# Patient Record
Sex: MALE | Race: WHITE | Employment: FULL TIME | URBAN - NONMETROPOLITAN AREA
[De-identification: names, ages, dates, MRNs, and addresses within clinical notes are randomized per-mention and may not be internally consistent; named-entity substitution may affect disease eponyms.]

---

## 2024-04-07 ENCOUNTER — APPOINTMENT (OUTPATIENT)
Dept: CT IMAGING | Age: 49
End: 2024-04-07

## 2024-04-07 ENCOUNTER — HOSPITAL ENCOUNTER (EMERGENCY)
Age: 49
Discharge: HOME | End: 2024-04-07
Attending: EMERGENCY MEDICINE

## 2024-04-07 ENCOUNTER — APPOINTMENT (OUTPATIENT)
Dept: GENERAL RADIOLOGY | Age: 49
End: 2024-04-07

## 2024-04-07 VITALS
HEART RATE: 77 BPM | TEMPERATURE: 98.8 F | SYSTOLIC BLOOD PRESSURE: 115 MMHG | WEIGHT: 220 LBS | OXYGEN SATURATION: 96 % | BODY MASS INDEX: 31.5 KG/M2 | DIASTOLIC BLOOD PRESSURE: 80 MMHG | RESPIRATION RATE: 19 BRPM | HEIGHT: 70 IN

## 2024-04-07 DIAGNOSIS — J10.1 INFLUENZA B: Primary | ICD-10-CM

## 2024-04-07 DIAGNOSIS — J18.9 FOCAL PNEUMONIA: ICD-10-CM

## 2024-04-07 LAB
ANION GAP SERPL CALC-SCNC: 13 MEQ/L (ref 8–16)
BASOPHILS ABSOLUTE: 0 THOU/MM3 (ref 0–0.1)
BASOPHILS NFR BLD AUTO: 0.4 %
BUN SERPL-MCNC: 13 MG/DL (ref 7–22)
CALCIUM SERPL-MCNC: 8.8 MG/DL (ref 8.5–10.5)
CHLORIDE SERPL-SCNC: 102 MEQ/L (ref 98–111)
CO2 SERPL-SCNC: 26 MEQ/L (ref 23–33)
CREAT SERPL-MCNC: 0.8 MG/DL (ref 0.4–1.2)
D DIMER PPP IA.FEU-MCNC: 1236 NG/ML FEU (ref 0–500)
DEPRECATED RDW RBC AUTO: 41 FL (ref 35–45)
EOSINOPHIL NFR BLD AUTO: 1.2 %
EOSINOPHILS ABSOLUTE: 0.1 THOU/MM3 (ref 0–0.4)
ERYTHROCYTE [DISTWIDTH] IN BLOOD BY AUTOMATED COUNT: 12.6 % (ref 11.5–14.5)
FLUAV RNA RESP QL NAA+PROBE: NOT DETECTED
FLUBV RNA RESP QL NAA+PROBE: DETECTED
GFR SERPL CREATININE-BSD FRML MDRD: > 90 ML/MIN/1.73M2
GLUCOSE SERPL-MCNC: 97 MG/DL (ref 70–108)
HCT VFR BLD AUTO: 39.9 % (ref 42–52)
HGB BLD-MCNC: 14.1 GM/DL (ref 14–18)
IMM GRANULOCYTES # BLD AUTO: 0.19 THOU/MM3 (ref 0–0.07)
IMM GRANULOCYTES NFR BLD AUTO: 2.1 %
LYMPHOCYTES ABSOLUTE: 2 THOU/MM3 (ref 1–4.8)
LYMPHOCYTES NFR BLD AUTO: 22.3 %
MCH RBC QN AUTO: 31.5 PG (ref 26–33)
MCHC RBC AUTO-ENTMCNC: 35.3 GM/DL (ref 32.2–35.5)
MCV RBC AUTO: 89.1 FL (ref 80–94)
MONOCYTES ABSOLUTE: 0.6 THOU/MM3 (ref 0.4–1.3)
MONOCYTES NFR BLD AUTO: 6.6 %
NEUTROPHILS NFR BLD AUTO: 67.4 %
NRBC BLD AUTO-RTO: 0 /100 WBC
NT-PROBNP SERPL IA-MCNC: 55.9 PG/ML (ref 0–124)
OSMOLALITY SERPL CALC.SUM OF ELEC: 281.3 MOSMOL/KG (ref 275–300)
PLATELET # BLD AUTO: 297 THOU/MM3 (ref 130–400)
PMV BLD AUTO: 9.4 FL (ref 9.4–12.4)
POTASSIUM SERPL-SCNC: 3.3 MEQ/L (ref 3.5–5.2)
RBC # BLD AUTO: 4.48 MILL/MM3 (ref 4.7–6.1)
SARS-COV-2 RNA RESP QL NAA+PROBE: NOT DETECTED
SEGMENTED NEUTROPHILS ABSOLUTE COUNT: 6 THOU/MM3 (ref 1.8–7.7)
SODIUM SERPL-SCNC: 141 MEQ/L (ref 135–145)
TROPONIN, HIGH SENSITIVITY: 11 NG/L (ref 0–12)
WBC # BLD AUTO: 8.9 THOU/MM3 (ref 4.8–10.8)

## 2024-04-07 PROCEDURE — 99285 EMERGENCY DEPT VISIT HI MDM: CPT

## 2024-04-07 PROCEDURE — 93005 ELECTROCARDIOGRAM TRACING: CPT | Performed by: STUDENT IN AN ORGANIZED HEALTH CARE EDUCATION/TRAINING PROGRAM

## 2024-04-07 PROCEDURE — 85025 COMPLETE CBC W/AUTO DIFF WBC: CPT

## 2024-04-07 PROCEDURE — 80048 BASIC METABOLIC PNL TOTAL CA: CPT

## 2024-04-07 PROCEDURE — 83880 ASSAY OF NATRIURETIC PEPTIDE: CPT

## 2024-04-07 PROCEDURE — 85379 FIBRIN DEGRADATION QUANT: CPT

## 2024-04-07 PROCEDURE — 71046 X-RAY EXAM CHEST 2 VIEWS: CPT

## 2024-04-07 PROCEDURE — 71275 CT ANGIOGRAPHY CHEST: CPT

## 2024-04-07 PROCEDURE — 93010 ELECTROCARDIOGRAM REPORT: CPT | Performed by: INTERNAL MEDICINE

## 2024-04-07 PROCEDURE — 36415 COLL VENOUS BLD VENIPUNCTURE: CPT

## 2024-04-07 PROCEDURE — 84484 ASSAY OF TROPONIN QUANT: CPT

## 2024-04-07 PROCEDURE — 87636 SARSCOV2 & INF A&B AMP PRB: CPT

## 2024-04-07 PROCEDURE — 6360000004 HC RX CONTRAST MEDICATION: Performed by: EMERGENCY MEDICINE

## 2024-04-07 RX ORDER — AZITHROMYCIN 250 MG/1
TABLET, FILM COATED ORAL
Qty: 1 PACKET | Refills: 0 | Status: SHIPPED | OUTPATIENT
Start: 2024-04-07 | End: 2024-04-11 | Stop reason: ALTCHOICE

## 2024-04-07 RX ADMIN — IOPAMIDOL 80 ML: 755 INJECTION, SOLUTION INTRAVENOUS at 11:37

## 2024-04-07 ASSESSMENT — PAIN - FUNCTIONAL ASSESSMENT: PAIN_FUNCTIONAL_ASSESSMENT: NONE - DENIES PAIN

## 2024-04-07 NOTE — ED PROVIDER NOTES
[facility] EMERGENCY DEPT    Pt Name: Chris Gates  MRN: [id number]  Birthdate 1975  Date of evaluation: 4/7/2024  Resident Physician: Jesica Glasgow MD  Supervising Physician: Dr. Peter Sue DO    CHIEF COMPLAINT       Chief Complaint   Patient presents with    Cough    Shortness of Breath    Hemoptysis       HISTORY OF PRESENT ILLNESS   Chris Gates is a 48 y.o. male  who presents to the emergency department for evaluation of cough, shortness of breath, hemoptysis.    Patient states that he has been coughing approximately 2 weeks now.  States that his daughter was diagnosed with influenza B so he assumed that he had this as well and did not think much of it.  States that this morning, he was in the bathroom when he coughed up a large amount of bright red blood.  States that this was approximately 2 for stools of blood.  At that time, he was also short of breath.  Denies any chest pain.  Came to ED for further evaluation.  This is never happened to him before.    The patient has no other acute complaints at this time.    Review of Systems    Negative Except as Documented Above.    PASTMEDICAL HISTORY   History reviewed. No pertinent past medical history.    There is no problem list on file for this patient.    SURGICAL HISTORY     History reviewed. No pertinent surgical history.    CURRENT MEDICATIONS       Discharge Medication List as of 4/7/2024 12:41 PM          ALLERGIES     has No Known Allergies.    FAMILY HISTORY     has no family status information on file.        SOCIAL HISTORY       Social History     Tobacco Use    Smoking status: Former     Types: Cigarettes       PHYSICAL EXAM       ED Triage Vitals   BP Temp Temp Source Pulse Respirations SpO2 Height Weight - Scale   04/07/24 1037 04/07/24 1037 04/07/24 1037 04/07/24 1037 04/07/24 1037 04/07/24 1037 04/07/24 1038 04/07/24 1038   (!) 140/81 98.8 °F (37.1 °C) Oral 83 18 94 % 1.778 m (5' 10\") 99.8 kg (220 lb)       Physical 
today.      CTA CHEST W WO CONTRAST   Final Result      1. No evidence of pulmonary embolism.   2. Old granulomatous disease in the left upper lobe, preaortic space and left hilum.   3. Left lower lobe infiltrate.   4. 6.1 mm noncalcified nodule in the right middle lobe..               **This report has been created using voice recognition software. It may contain minor errors which are inherent in voice recognition technology.**      Final report electronically signed by DR MARYANN DOMINGUEZ on 4/7/2024 11:51 AM      XR CHEST (2 VW)   Final Result   1. Normal heart size. No effusion.   2. Mild left basilar pneumonia.            **This report has been created using voice recognition software.  It may contain minor errors which are inherent in voice recognition technology.**      Final report electronically signed by Dr. Jigar Au on 4/7/2024 11:11 AM        Labs Reviewed   COVID-19 & INFLUENZA COMBO - Abnormal; Notable for the following components:       Result Value    INFLUENZA B DETECTED (*)     All other components within normal limits   BASIC METABOLIC PANEL - Abnormal; Notable for the following components:    Potassium 3.3 (*)     All other components within normal limits   CBC WITH AUTO DIFFERENTIAL - Abnormal; Notable for the following components:    RBC 4.48 (*)     Hematocrit 39.9 (*)     Immature Grans (Abs) 0.19 (*)     All other components within normal limits   D-DIMER, QUANTITATIVE - Abnormal; Notable for the following components:    D-Dimer, Quant 1236.00 (*)     All other components within normal limits   TROPONIN   BRAIN NATRIURETIC PEPTIDE   ANION GAP   OSMOLALITY   GLOMERULAR FILTRATION RATE, ESTIMATED         Final diagnoses:   Influenza B   Focal pneumonia   .  I have seen this patient with the resident Dr. Gamboa and agree with her assessment and plan.     Peter Sue, DO  04/07/24 1952

## 2024-04-07 NOTE — DISCHARGE INSTRUCTIONS
You are seen today in the emergency department for coughing up blood earlier today.  You tested positive for influenza B.  Your chest x-ray shows that you have a pneumonia.  You do not have any blood clots.    We are sending home with antibiotics to treat your pneumonia.  Please take this as prescribed.    Follow-up with your family doctor regarding today's visit.  As you do not have 1 on file, I am referring you to the family medicine clinic    For your CT findings:   IMPRESSION:     1. No evidence of pulmonary embolism.  2. Old granulomatous disease in the left upper lobe, preaortic space and left hilum.  3. Left lower lobe infiltrate.  4. 6.1 mm noncalcified nodule in the right middle lobe..    I am referring you to the pulmonologist.

## 2024-04-07 NOTE — ED TRIAGE NOTES
Pt to ER due to cough, shortness of breath, and hemoptysis. He states he has had the cough for around two weeks but this morning for the first time he coughed up bright red blood and felt short of breath. EKG and labs completed upon arrival.

## 2024-04-10 SDOH — HEALTH STABILITY: PHYSICAL HEALTH: ON AVERAGE, HOW MANY DAYS PER WEEK DO YOU ENGAGE IN MODERATE TO STRENUOUS EXERCISE (LIKE A BRISK WALK)?: 5 DAYS

## 2024-04-10 SDOH — HEALTH STABILITY: PHYSICAL HEALTH: ON AVERAGE, HOW MANY MINUTES DO YOU ENGAGE IN EXERCISE AT THIS LEVEL?: 60 MIN

## 2024-04-11 ENCOUNTER — OFFICE VISIT (OUTPATIENT)
Dept: FAMILY MEDICINE CLINIC | Age: 49
End: 2024-04-11

## 2024-04-11 VITALS
HEART RATE: 63 BPM | RESPIRATION RATE: 16 BRPM | HEIGHT: 70 IN | SYSTOLIC BLOOD PRESSURE: 112 MMHG | WEIGHT: 205.6 LBS | BODY MASS INDEX: 29.43 KG/M2 | OXYGEN SATURATION: 96 % | DIASTOLIC BLOOD PRESSURE: 60 MMHG | TEMPERATURE: 97.7 F

## 2024-04-11 DIAGNOSIS — Z87.891 FORMER SMOKER: ICD-10-CM

## 2024-04-11 DIAGNOSIS — F17.220 NICOTINE DEPENDENCE, CHEWING TOBACCO, UNCOMPLICATED: ICD-10-CM

## 2024-04-11 DIAGNOSIS — J18.9 PNEUMONIA DUE TO INFECTIOUS ORGANISM, UNSPECIFIED LATERALITY, UNSPECIFIED PART OF LUNG: ICD-10-CM

## 2024-04-11 DIAGNOSIS — R91.8 ABNORMAL CT SCAN OF LUNG: Primary | ICD-10-CM

## 2024-04-11 PROBLEM — R76.8 HEPATITIS C ANTIBODY TEST POSITIVE: Status: RESOLVED | Noted: 2024-04-11 | Resolved: 2024-04-11

## 2024-04-11 PROBLEM — R76.8 HEPATITIS C ANTIBODY TEST POSITIVE: Status: ACTIVE | Noted: 2024-04-11

## 2024-04-11 LAB
EKG ATRIAL RATE: 86 BPM
EKG P AXIS: 71 DEGREES
EKG P-R INTERVAL: 144 MS
EKG Q-T INTERVAL: 378 MS
EKG QRS DURATION: 86 MS
EKG QTC CALCULATION (BAZETT): 452 MS
EKG R AXIS: 54 DEGREES
EKG T AXIS: 50 DEGREES
EKG VENTRICULAR RATE: 86 BPM

## 2024-04-11 PROCEDURE — 99203 OFFICE O/P NEW LOW 30 MIN: CPT | Performed by: NURSE PRACTITIONER

## 2024-04-11 RX ORDER — IBUPROFEN 600 MG/1
600 TABLET ORAL EVERY 8 HOURS PRN
COMMUNITY
Start: 2013-10-11 | End: 2024-04-11

## 2024-04-11 SDOH — ECONOMIC STABILITY: HOUSING INSECURITY
IN THE LAST 12 MONTHS, WAS THERE A TIME WHEN YOU DID NOT HAVE A STEADY PLACE TO SLEEP OR SLEPT IN A SHELTER (INCLUDING NOW)?: NO

## 2024-04-11 SDOH — ECONOMIC STABILITY: FOOD INSECURITY: WITHIN THE PAST 12 MONTHS, THE FOOD YOU BOUGHT JUST DIDN'T LAST AND YOU DIDN'T HAVE MONEY TO GET MORE.: NEVER TRUE

## 2024-04-11 SDOH — ECONOMIC STABILITY: FOOD INSECURITY: WITHIN THE PAST 12 MONTHS, YOU WORRIED THAT YOUR FOOD WOULD RUN OUT BEFORE YOU GOT MONEY TO BUY MORE.: NEVER TRUE

## 2024-04-11 SDOH — ECONOMIC STABILITY: INCOME INSECURITY: HOW HARD IS IT FOR YOU TO PAY FOR THE VERY BASICS LIKE FOOD, HOUSING, MEDICAL CARE, AND HEATING?: NOT HARD AT ALL

## 2024-04-11 ASSESSMENT — PATIENT HEALTH QUESTIONNAIRE - PHQ9
SUM OF ALL RESPONSES TO PHQ QUESTIONS 1-9: 0
2. FEELING DOWN, DEPRESSED OR HOPELESS: NOT AT ALL
SUM OF ALL RESPONSES TO PHQ9 QUESTIONS 1 & 2: 0
1. LITTLE INTEREST OR PLEASURE IN DOING THINGS: NOT AT ALL
SUM OF ALL RESPONSES TO PHQ QUESTIONS 1-9: 0

## 2024-04-11 ASSESSMENT — ENCOUNTER SYMPTOMS
ABDOMINAL PAIN: 0
EYE PAIN: 0
CHEST TIGHTNESS: 1
NAUSEA: 0
SORE THROAT: 0
SHORTNESS OF BREATH: 0
VOMITING: 0
COUGH: 1

## 2024-04-11 NOTE — PROGRESS NOTES
[facility] PROFESSIONAL [facility] MEDICINE  [address]  [facility] [address]  Dept: [phone]  Dept Fax: [phone]  Loc: [phone]     Visit Date:  4/11/2024    Patient:  Chris Gates  YOB: 1975  Age: 48 y.o.  Gender: male  BMI: Body mass index is 29.5 kg/m².    Chris Gates, New patient, is being seen today for   Chief Complaint   Patient presents with    New Patient    lung issues     Needing referral to lung specialist.  Had flu B last week and was coughing up blood. Pt stated they found something on his lungs on a CT scan.    .     Assessment/Plan      1. Abnormal CT scan of lung  -     [facility], [place]  2. Pneumonia due to infectious organism, unspecified laterality, unspecified part of lung  3. Nicotine dependence, chewing tobacco, uncomplicated    Was referred to pulmonology by ER but requires PCP referral. Referral placed.  Will see back in about 4 weeks for full physical and labs.    Return in about 4 weeks (around 5/9/2024) for Physical.    HPI:     Patient presents today for a new patient appointment. Former patient of none. Health maintenance reviewed. Never had colonoscopy. Medical history significant for Hep C - now immune, former heroin user. Current specialists include none. Current concerns include abnormal lung CT. Still has some cough from pneumonia and feels like he still needs to cough stuff up. Still has fatigue. Has not have fever or chills for about 1 week. Went to ER because he was coughing up blood on Sunday morning. CT in ER was abnormal and referral to pulmonology outpatient was recommended but pulmonology told him he will need a referral from his PCP.          Medications  No current outpatient medications on file.    The patient has No Known Allergies.    Past Medical History  Chris  has a past medical history of Hepatitis C antibody positive in blood and Hepatitis

## 2024-04-25 ENCOUNTER — OFFICE VISIT (OUTPATIENT)
Dept: PULMONOLOGY | Age: 49
End: 2024-04-25
Payer: COMMERCIAL

## 2024-04-25 VITALS
SYSTOLIC BLOOD PRESSURE: 116 MMHG | HEIGHT: 70 IN | OXYGEN SATURATION: 95 % | DIASTOLIC BLOOD PRESSURE: 84 MMHG | WEIGHT: 203.6 LBS | BODY MASS INDEX: 29.15 KG/M2 | HEART RATE: 66 BPM | TEMPERATURE: 98 F

## 2024-04-25 DIAGNOSIS — Z87.891 PERSONAL HISTORY OF SMOKING: ICD-10-CM

## 2024-04-25 DIAGNOSIS — Z87.01 HISTORY OF INFLUENZA PNEUMONIA: ICD-10-CM

## 2024-04-25 DIAGNOSIS — R04.2 HEMOPTYSIS: Primary | ICD-10-CM

## 2024-04-25 DIAGNOSIS — F12.90 MARIJUANA SMOKER: ICD-10-CM

## 2024-04-25 DIAGNOSIS — R91.1 LUNG NODULE: ICD-10-CM

## 2024-04-25 PROCEDURE — 99204 OFFICE O/P NEW MOD 45 MIN: CPT | Performed by: INTERNAL MEDICINE

## 2024-04-25 PROCEDURE — 99406 BEHAV CHNG SMOKING 3-10 MIN: CPT | Performed by: INTERNAL MEDICINE

## 2024-04-25 NOTE — PROGRESS NOTES
nodule  CT CHEST WO CONTRAST        History of testing positive for flu B at the emergency room 4/7/2024 however the appearance of the CT scan of the chest revealing a lobar infiltrate suggest a bacterial process.    6 mm right middle lobe lung nodule in a high risk patient    Hemoptysis due to acute lower respiratory tract infection currently resolved    31-pack-year history of smoking and strong family history of lung cancer.     Plan     Orders Placed This Encounter   Procedures    CT CHEST WO CONTRAST     Standing Status:   Future     Standing Expiration Date:   4/25/2025     Order Specific Question:   Reason for exam:     Answer:   Lung Nodules    Full PFT Study With Bronchodilator     If an ABG is needed along with this PFT procedure, please place the appropriate lab order     Standing Status:   Future     Standing Expiration Date:   4/25/2025      Chris has been strongly advised not to smoke any substances into his lungs, consider switching to edible cannabis.  Will bring Chris back with pulmonary function test and a follow-up CT scan of the chest to be done 6 weeks after initial CT.  Chris is to notify of my office if he resumes coughing up blood or his condition changes     Patient was counseled on tobacco cessation.  Based upon patient's motivation to change his behavior, the following plan was agreed upon: not ready to quit cannabis at this time He was provided with a list of local tobacco cessation resources. Provider spent 5 minutes counseling patient.     **This report has been created using voice recognition software. It may contain minor errors which are inherent in voice recognition technology.**

## 2024-07-25 ENCOUNTER — HOSPITAL ENCOUNTER (EMERGENCY)
Age: 49
Discharge: HOME | End: 2024-07-26
Attending: EMERGENCY MEDICINE

## 2024-07-25 ENCOUNTER — APPOINTMENT (OUTPATIENT)
Dept: GENERAL RADIOLOGY | Age: 49
End: 2024-07-25

## 2024-07-25 VITALS
BODY MASS INDEX: 32.21 KG/M2 | DIASTOLIC BLOOD PRESSURE: 62 MMHG | SYSTOLIC BLOOD PRESSURE: 108 MMHG | OXYGEN SATURATION: 95 % | RESPIRATION RATE: 14 BRPM | TEMPERATURE: 97.5 F | HEIGHT: 70 IN | WEIGHT: 225 LBS | HEART RATE: 58 BPM

## 2024-07-25 DIAGNOSIS — T40.604A OPIATE OVERDOSE, UNDETERMINED INTENT, INITIAL ENCOUNTER (HCC): Primary | ICD-10-CM

## 2024-07-25 LAB
ALBUMIN SERPL BCG-MCNC: 4.6 G/DL (ref 3.5–5.1)
ALP SERPL-CCNC: 88 U/L (ref 38–126)
ALT SERPL W/O P-5'-P-CCNC: 24 U/L (ref 11–66)
ANION GAP SERPL CALC-SCNC: 17 MEQ/L (ref 8–16)
AST SERPL-CCNC: 27 U/L (ref 5–40)
BASOPHILS ABSOLUTE: 0 THOU/MM3 (ref 0–0.1)
BASOPHILS NFR BLD AUTO: 0.3 %
BILIRUB CONJ SERPL-MCNC: 0.4 MG/DL (ref 0.1–13.8)
BILIRUB SERPL-MCNC: 1 MG/DL (ref 0.3–1.2)
BUN SERPL-MCNC: 20 MG/DL (ref 7–22)
CALCIUM SERPL-MCNC: 9 MG/DL (ref 8.5–10.5)
CHLORIDE SERPL-SCNC: 97 MEQ/L (ref 98–111)
CO2 SERPL-SCNC: 23 MEQ/L (ref 23–33)
CREAT SERPL-MCNC: 1.2 MG/DL (ref 0.4–1.2)
DEPRECATED RDW RBC AUTO: 41.8 FL (ref 35–45)
EOSINOPHIL NFR BLD AUTO: 0.3 %
EOSINOPHILS ABSOLUTE: 0 THOU/MM3 (ref 0–0.4)
ERYTHROCYTE [DISTWIDTH] IN BLOOD BY AUTOMATED COUNT: 12.4 % (ref 11.5–14.5)
ETHANOL SERPL-MCNC: < 0.01 % (ref 0–0.08)
GFR SERPL CREATININE-BSD FRML MDRD: 75 ML/MIN/1.73M2
GLUCOSE SERPL-MCNC: 215 MG/DL (ref 70–108)
HCT VFR BLD AUTO: 44.7 % (ref 42–52)
HGB BLD-MCNC: 15.3 GM/DL (ref 14–18)
IMM GRANULOCYTES # BLD AUTO: 0.02 THOU/MM3 (ref 0–0.07)
IMM GRANULOCYTES NFR BLD AUTO: 0.3 %
LYMPHOCYTES ABSOLUTE: 1.7 THOU/MM3 (ref 1–4.8)
LYMPHOCYTES NFR BLD AUTO: 25.8 %
MAGNESIUM SERPL-MCNC: 2 MG/DL (ref 1.6–2.4)
MCH RBC QN AUTO: 31.5 PG (ref 26–33)
MCHC RBC AUTO-ENTMCNC: 34.2 GM/DL (ref 32.2–35.5)
MCV RBC AUTO: 92 FL (ref 80–94)
MONOCYTES ABSOLUTE: 0.2 THOU/MM3 (ref 0.4–1.3)
MONOCYTES NFR BLD AUTO: 3.4 %
NEUTROPHILS ABSOLUTE: 4.5 THOU/MM3 (ref 1.8–7.7)
NEUTROPHILS NFR BLD AUTO: 69.9 %
NRBC BLD AUTO-RTO: 0 /100 WBC
OSMOLALITY SERPL CALC.SUM OF ELEC: 282.9 MOSMOL/KG (ref 275–300)
PLATELET # BLD AUTO: 190 THOU/MM3 (ref 130–400)
PMV BLD AUTO: 9.8 FL (ref 9.4–12.4)
POTASSIUM SERPL-SCNC: 3.9 MEQ/L (ref 3.5–5.2)
PROT SERPL-MCNC: 7.1 G/DL (ref 6.1–8)
RBC # BLD AUTO: 4.86 MILL/MM3 (ref 4.7–6.1)
SODIUM SERPL-SCNC: 137 MEQ/L (ref 135–145)
TROPONIN, HIGH SENSITIVITY: 8 NG/L (ref 0–12)
WBC # BLD AUTO: 6.4 THOU/MM3 (ref 4.8–10.8)

## 2024-07-25 PROCEDURE — 36415 COLL VENOUS BLD VENIPUNCTURE: CPT

## 2024-07-25 PROCEDURE — 82248 BILIRUBIN DIRECT: CPT

## 2024-07-25 PROCEDURE — 71045 X-RAY EXAM CHEST 1 VIEW: CPT

## 2024-07-25 PROCEDURE — 80053 COMPREHEN METABOLIC PANEL: CPT

## 2024-07-25 PROCEDURE — 99284 EMERGENCY DEPT VISIT MOD MDM: CPT

## 2024-07-25 PROCEDURE — 84484 ASSAY OF TROPONIN QUANT: CPT

## 2024-07-25 PROCEDURE — 85025 COMPLETE CBC W/AUTO DIFF WBC: CPT

## 2024-07-25 PROCEDURE — 83735 ASSAY OF MAGNESIUM: CPT

## 2024-07-25 PROCEDURE — 82077 ASSAY SPEC XCP UR&BREATH IA: CPT

## 2024-07-25 PROCEDURE — 2580000003 HC RX 258

## 2024-07-25 RX ORDER — 0.9 % SODIUM CHLORIDE 0.9 %
1000 INTRAVENOUS SOLUTION INTRAVENOUS ONCE
Status: COMPLETED | OUTPATIENT
Start: 2024-07-25 | End: 2024-07-26

## 2024-07-25 RX ADMIN — SODIUM CHLORIDE 1000 ML: 9 INJECTION, SOLUTION INTRAVENOUS at 23:17

## 2024-07-26 NOTE — ED NOTES
Pt in bed, eyes open, respirations even and unlabored. Vital signs reassessed, no needs voiced. Pt reminded of need for urine, states that he can not at this time. Fluids started per MAR.

## 2024-07-26 NOTE — ED NOTES
Girlfriend called at this time. Pt approved giving her information. Girlfriend notes that patient has not made direct suicidal thoughts but has insinuated those thoughts. Pt denies to RN. Dr. Paulson notified.

## 2024-07-26 NOTE — ED PROVIDER NOTES
[facility] EMERGENCY DEPT  EMERGENCY DEPARTMENT ENCOUNTER          Pt Name: Chris Gates  MRN: [id number]  Birthdate 1975  Date of evaluation: 7/25/2024  Physician: Salas Cruz MD  Supervising Attending Physician: Per Nielson DO       CHIEF COMPLAINT       Chief Complaint   Patient presents with    Drug Overdose         HISTORY OF PRESENT ILLNESS    HPI  Chris Gates is a 48 y.o. male who presents to the emergency department from home, brought in by EMS for evaluation of drug overdose    Patient states that he brought fentanyl off the street yesterday and used it last night and again today.  Next thing he knew he was receiving CPR by EMS and brought into the hospital.  Per EMS he was given a total of 8 mg of Narcan.  At 1 point he did lose pulses and CPR was started.  Patient woke up after that and was brought in for further evaluation at the moment he reports some chest pain.  He states that he is febrile for the first time yesterday.  He has a past of heroin abuse and has been clean since 2017.  He denies any other substance use except for cannabis.  He does not report any past medical history and is not on any medications.  The patient denied any suicidal intent.  The patient has no other acute complaints at this time.      REVIEW OF SYSTEMS   Review of Systems      PAST MEDICAL AND SURGICAL HISTORY     Past Medical History:   Diagnosis Date    Hepatitis C antibody positive in blood     negative viral load    Hepatitis C antibody test positive 04/11/2024    Viral load was negative     History reviewed. No pertinent surgical history.      MEDICATIONS   No current facility-administered medications for this encounter.  No current outpatient medications on file.    Previous Medications    No medications on file         SOCIAL HISTORY     Social History     Social History Narrative    Not on file     Social History     Tobacco Use    Smoking status: Some Days     Current

## 2024-07-26 NOTE — ED TRIAGE NOTES
Pt presents to ED via EMS due to drug overdose. EMS reports they got to scene and PD had given 2 of narcan and started CPR, pt was completely unresponsive. EMS reports giving two more of narcan and pt became responsive. Pt reports that he used to used heroin, meth, and cocaine, but quit in 2017. Pt states that he got this fentanyl from \"the streets in [place],\" and \"used some yesterday and then some today.\" Pt reports that he snorts it. Pt denies suicidal and homicidal ideation. Pt states, \"I just started using again due to stress.\" When asked about talking to a , pt states, \"I don't want to do that.\" Pt alert and oriented x4.

## 2024-07-26 NOTE — DISCHARGE INSTRUCTIONS
Follow-up with your primary care physician for assistance with drug abuse.    Return to the emergency department immediately if there is any new or concerning symptom.

## 2024-07-26 NOTE — ED NOTES
Pt in bed, eyes open, respirations even and unlabored. Vital signs reassessed, no needs voiced. Pt states he can not urinate at this time.

## 2024-07-26 NOTE — ED NOTES
Pt in bed, eyes open, respirations even and unlabored. Vital signs reassessed, pt reminded of need for urine, pt states he can't right now. No needs voiced.